# Patient Record
Sex: MALE | Race: WHITE | NOT HISPANIC OR LATINO | Employment: FULL TIME
[De-identification: names, ages, dates, MRNs, and addresses within clinical notes are randomized per-mention and may not be internally consistent; named-entity substitution may affect disease eponyms.]

---

## 2018-05-20 ENCOUNTER — RECORDS - HEALTHEAST (OUTPATIENT)
Dept: ADMINISTRATIVE | Facility: OTHER | Age: 58
End: 2018-05-20

## 2018-12-17 ENCOUNTER — OFFICE VISIT (OUTPATIENT)
Dept: OPTOMETRY | Facility: CLINIC | Age: 58
End: 2018-12-17
Payer: COMMERCIAL

## 2018-12-17 DIAGNOSIS — H52.221 REGULAR ASTIGMATISM OF RIGHT EYE: ICD-10-CM

## 2018-12-17 DIAGNOSIS — H52.4 PRESBYOPIA: Primary | ICD-10-CM

## 2018-12-17 DIAGNOSIS — H52.03 HYPERMETROPIA OF BOTH EYES: ICD-10-CM

## 2018-12-17 PROCEDURE — 92015 DETERMINE REFRACTIVE STATE: CPT | Performed by: OPTOMETRIST

## 2018-12-17 PROCEDURE — 92004 COMPRE OPH EXAM NEW PT 1/>: CPT | Performed by: OPTOMETRIST

## 2018-12-17 RX ORDER — LISINOPRIL 20 MG/1
20 TABLET ORAL DAILY
COMMUNITY

## 2018-12-17 RX ORDER — SIMVASTATIN 40 MG
40 TABLET ORAL AT BEDTIME
COMMUNITY

## 2018-12-17 RX ORDER — ATENOLOL 100 MG/1
100 TABLET ORAL DAILY
COMMUNITY

## 2018-12-17 RX ORDER — ASPIRIN 81 MG/1
81 TABLET ORAL DAILY
COMMUNITY

## 2018-12-17 ASSESSMENT — VISUAL ACUITY
OS_SC: 20/20
OS_CC: 20/40
METHOD: SNELLEN - LINEAR
OD_SC+: -2
CORRECTION_TYPE: GLASSES
OD_SC: 20/30
OD_CC: 20/60-1

## 2018-12-17 ASSESSMENT — TONOMETRY
IOP_METHOD: TONOPEN
OD_IOP_MMHG: 20
OS_IOP_MMHG: 19

## 2018-12-17 ASSESSMENT — SLIT LAMP EXAM - LIDS
COMMENTS: 1+ DERMATOCHALASIS
COMMENTS: 1+ DERMATOCHALASIS

## 2018-12-17 ASSESSMENT — REFRACTION_MANIFEST
OS_CYLINDER: SPHERE
OS_ADD: +2.00
OS_SPHERE: +0.50
OD_CYLINDER: +1.00
OD_SPHERE: +0.50
OD_ADD: +2.00
OD_AXIS: 176

## 2018-12-17 ASSESSMENT — CONF VISUAL FIELD
OS_NORMAL: 1
OD_NORMAL: 1

## 2018-12-17 ASSESSMENT — REFRACTION_WEARINGRX
OD_SPHERE: +2.00
SPECS_TYPE: OTC READERS
OD_CYLINDER: SPHERE
OS_CYLINDER: SPHERE
OS_SPHERE: +2.00

## 2018-12-17 ASSESSMENT — CUP TO DISC RATIO
OS_RATIO: 0.3
OD_RATIO: 0.2

## 2018-12-17 ASSESSMENT — EXTERNAL EXAM - LEFT EYE: OS_EXAM: NORMAL

## 2018-12-17 ASSESSMENT — EXTERNAL EXAM - RIGHT EYE: OD_EXAM: NORMAL

## 2018-12-17 NOTE — LETTER
12/17/2018         RE: Vic Vasquez  [address] [address]        Dear Colleague,    Thank you for referring your patient, Vic Vasquez, to the [facility]. Please see a copy of my visit note below.    Chief Complaint   Patient presents with     Annual Eye Exam         Last Eye Exam: 1st eye exam  Dilated Previously: No, side effects of dilation explained today,info given to patient     What are you currently using to see? otc readers       Distance Vision Acuity: Satisfied with vision    Near Vision Acuity: Not satisfied     Eye Comfort: good  Do you use eye drops? : Yes: visine every morning  Occupation or Hobbies: install kitchen appliances     Susana Piedra Optometric Assistant, A.B.O.C.          Medical, surgical and family histories reviewed and updated 12/17/2018.       OBJECTIVE: See Ophthalmology exam    ASSESSMENT:    ICD-10-CM    1. Presbyopia H52.4 EYE EXAM (SIMPLE-NONBILLABLE)     REFRACTION   2. Regular astigmatism of right eye H52.221 EYE EXAM (SIMPLE-NONBILLABLE)     REFRACTION   3. 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  Your options are a bifocal which would allow you to see distance and near vision or separate glasses for distance and reading.    Return in 1 year for a complete eye exam or sooner if needed.    Lucio Santos OD           Again, thank you for allowing me to participate in the care of your patient.        Sincerely,        Lucio Santos, OD

## 2018-12-17 NOTE — PROGRESS NOTES
Chief Complaint   Patient presents with     Annual Eye Exam         Last Eye Exam: 1st eye exam  Dilated Previously: No, side effects of dilation explained today,info given to patient     What are you currently using to see? otc readers       Distance Vision Acuity: Satisfied with vision    Near Vision Acuity: Not satisfied     Eye Comfort: good  Do you use eye drops? : Yes: visine every morning  Occupation or Hobbies: install kitchen appliances     Susana Piedra Optometric Assistant, A.B.O.C.          Medical, surgical and family histories reviewed and updated 12/17/2018.       OBJECTIVE: See Ophthalmology exam    ASSESSMENT:    ICD-10-CM    1. Presbyopia H52.4 EYE EXAM (SIMPLE-NONBILLABLE)     REFRACTION   2. Regular astigmatism of right eye H52.221 EYE EXAM (SIMPLE-NONBILLABLE)     REFRACTION   3. 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  Your options are a bifocal which would allow you to see distance and near vision or separate glasses for distance and reading.    Return in 1 year for a complete eye exam or sooner if needed.    Lucio Santos, OD

## 2018-12-18 NOTE — PATIENT INSTRUCTIONS
Eyeglass prescription given.  Your options are a bifocal which would allow you to see distance and near vision or separate glasses for distance and reading.    Return in 1 year for a complete eye exam or sooner if needed.    Lucio Santos OD    The affects of the dilating drops last for 4- 6 hours.  You will be more sensitive to light and vision will be blurry up close.  Mydriatic sunglasses were given if needed.      Optometry Providers       Clinic Locations                                 Telephone Number   Dr. Dahiana Gutierrez [facility] [place]   [place] [phone]     [facility] Hours:                [place] [facility] Hours:       [facility] Hours:   [address]   [address] [facility] [address]     [address] [place] [address]   [place] [address]    [place] [address]  Phone: [phone]                    Phone: [phone]     Phone: [phone]                      Monday 8:00-7:00                          Monday 8:00-7:00                          Monday 8:00-7:00              Tuesday 8:00-6:00                          Tuesday 8:00-7:00                          Tuesday 8:00-7:00              Wednesday 8:00-6:00                  Wednesday 8:00-7:00                   Wednesday 8:00-7:00      Thursday 8:00-6:00                        Thursday 8:00-7:00                         Thursday 8:00-7:00            Friday 8:00-5:00                              Friday 8:00-5:00                              Friday 8:00-5:00    [facility] Hours:   [address] [place] [address]  [phone]    Monday 8:00-7:00  Tuesday 8:00-7:00  Wednesday 8:00-7:00  Thursday 8:00-7:00  Friday 8:00-5:00  Please log on to [email or web address] to order your contact lenses.  The link is found on the [facility] page.  As always, Thank you for trusting us with your health care  needs!

## 2020-05-19 ENCOUNTER — RECORDS - HEALTHEAST (OUTPATIENT)
Dept: ADMINISTRATIVE | Facility: OTHER | Age: 60
End: 2020-05-19

## 2023-08-21 ENCOUNTER — OFFICE VISIT (OUTPATIENT)
Dept: URGENT CARE | Facility: URGENT CARE | Age: 63
End: 2023-08-21
Payer: OTHER MISCELLANEOUS

## 2023-08-21 VITALS
SYSTOLIC BLOOD PRESSURE: 146 MMHG | HEART RATE: 51 BPM | DIASTOLIC BLOOD PRESSURE: 83 MMHG | RESPIRATION RATE: 20 BRPM | TEMPERATURE: 97.6 F | OXYGEN SATURATION: 99 % | WEIGHT: 245.5 LBS

## 2023-08-21 DIAGNOSIS — S61.012A LACERATION OF LEFT THUMB WITHOUT FOREIGN BODY WITHOUT DAMAGE TO NAIL, INITIAL ENCOUNTER: Primary | ICD-10-CM

## 2023-08-21 PROCEDURE — 99207 PR NO CHARGE LOS: CPT

## 2023-08-21 PROCEDURE — 12001 RPR S/N/AX/GEN/TRNK 2.5CM/<: CPT | Mod: FA

## 2023-08-21 NOTE — PROGRESS NOTES
ASSESSMENT:  (S61.172M) Laceration of left thumb without foreign body without damage to nail, initial encounter  (primary encounter diagnosis)  Plan: REPAIR SUPERFICIAL, WOUND BODY < =2.5CM,         Dressing, lidocaine 1 % 2 mL    PLAN:  Digital/regional block applied using a total of 2 cc's of Lidocaine 1% plain  Good anesthesia was obtained  Prepped and draped in the usual sterile fashion  Wound cleaned with povidone   Wound irrigated with 20ccs of sterile water  Laceration was closed using 4 4-0 nylon interrupted sutures    After care instructions:  We discussed the need to keep the wound clean and dry for the next 48 hours and after 48 hours it is okay to get wet but do not submerge.  Discussed using bacitracin and Band-Aid to the area for dressing.  Informed the patient to have the sutures removed in 14 days.  Discussed the need to return to clinic with any redness, swelling, drainage, bleeding, pain and/or fever/chills.  Patient acknowledged their understanding of the above plan.     The use of Dragon/Invenshure dictation services may have been used to construct the content in this note; any grammatical or spelling errors are non-intentional. Please contact the author of this note directly if you are in need of any clarification.      Adrien Tracy, BRIONNA CNP      SUBJECTIVE:  Vic Vasquez is a 63 year old male who presents to the clinic with a laceration on the left thumb sustained 3 hours ago.  This is a work related injury.  Mechanism of injury: cut on metal.    Associated symptoms: Denies numbness, weakness, or loss of function  Last tetanus booster within 5 years: yes    ROS:   Negative except noted above.    OBJECTIVE:  Blood pressure (!) 146/83, pulse 51, temperature 97.6  F (36.4  C), temperature source Tympanic, resp. rate 20, weight 111.4 kg (245 lb 8 oz), SpO2 99 %.  The patient appears today in alert and in no apparent distress distress  Size of laceration: 2 centimeters  Characteristics of  the laceration: active bleeding and jagged  Tendon function intact: yes  Sensation to light touch intact: yes  Pulses intact: yes

## 2023-08-22 NOTE — PATIENT INSTRUCTIONS
Keep the wound clean and dry for the next 48 hours.  After 48 hours it is okay to get wet but do not submerge.  Bacitracin and Band-Aid to the area for dressing.  Sutures removed in 14 days.  Return to clinic, with any redness, swelling, drainage, bleeding, pain and/or fever/chills.

## 2023-09-05 ENCOUNTER — OFFICE VISIT (OUTPATIENT)
Dept: URGENT CARE | Facility: URGENT CARE | Age: 63
End: 2023-09-05
Payer: OTHER MISCELLANEOUS

## 2023-09-05 VITALS
SYSTOLIC BLOOD PRESSURE: 137 MMHG | WEIGHT: 245.2 LBS | TEMPERATURE: 98.2 F | OXYGEN SATURATION: 97 % | DIASTOLIC BLOOD PRESSURE: 73 MMHG | HEART RATE: 58 BPM

## 2023-09-05 DIAGNOSIS — Z48.02 VISIT FOR SUTURE REMOVAL: Primary | ICD-10-CM

## 2023-09-05 DIAGNOSIS — S61.012D LACERATION OF LEFT THUMB WITHOUT COMPLICATION, SUBSEQUENT ENCOUNTER: ICD-10-CM

## 2023-09-05 PROCEDURE — 99207 PR NO CHARGE LOS: CPT | Performed by: PHYSICIAN ASSISTANT

## 2023-09-05 ASSESSMENT — ENCOUNTER SYMPTOMS
WHEEZING: 0
JOINT SWELLING: 0
SHORTNESS OF BREATH: 0
FEVER: 0
CONSTITUTIONAL NEGATIVE: 1
NECK STIFFNESS: 0
MYALGIAS: 0
BACK PAIN: 0
CHILLS: 0
FATIGUE: 0
COUGH: 0
COLOR CHANGE: 0
WOUND: 0
NECK PAIN: 0
ARTHRALGIAS: 0
CHEST TIGHTNESS: 0
PALPITATIONS: 0

## 2023-09-05 NOTE — PROGRESS NOTES
Jeanette Ho is a 63 year old, presenting for the following health issues:  Suture Removal    HPI   Concern - suture removal   Onset: 15days  Description:  Sustained a L thumb laceration 15days ago and was repaired here in urgent care.  Was told to return for suture removal in 14days.    Intensity: mild  Progression of Symptoms:  improving  Accompanying Signs & Symptoms: No swelling, redness, drainage or fevers.  UTD on his Td.  Previous history of similar problem: none  Precipitating factors:        Worsened by: none  Alleviating factors:        Improved by: none  Therapies tried and outcome: suture repair with good relief  There is no problem list on file for this patient.    Current Outpatient Medications   Medication    aspirin 81 MG EC tablet    atenolol (TENORMIN) 100 MG tablet    lisinopril (PRINIVIL/ZESTRIL) 20 MG tablet    simvastatin (ZOCOR) 40 MG tablet     No current facility-administered medications for this visit.      No Known Allergies    Review of Systems   Constitutional: Negative.  Negative for chills, fatigue and fever.   Respiratory:  Negative for cough, chest tightness, shortness of breath and wheezing.    Cardiovascular:  Negative for chest pain, palpitations and peripheral edema.   Musculoskeletal:  Negative for arthralgias, back pain, gait problem, joint swelling, myalgias, neck pain and neck stiffness.   Skin: Negative.  Negative for color change, pallor, rash and wound.   All other systems reviewed and are negative.           Objective    /73 (BP Location: Left arm, Patient Position: Sitting, Cuff Size: Adult Large)   Pulse 58   Temp 98.2  F (36.8  C) (Tympanic)   Wt 111.2 kg (245 lb 3.2 oz)   SpO2 97%   There is no height or weight on file to calculate BMI.  Physical Exam  Vitals and nursing note reviewed.   Constitutional:       General: He is not in acute distress.     Appearance: Normal appearance. He is normal weight. He is not ill-appearing.   Musculoskeletal:       Right hand: Normal.      Left hand: Laceration (well healed laceration present over the thumb.  no tenderness, erythema or drainage present.) present. No swelling, deformity, tenderness or bony tenderness. Normal range of motion. Normal strength. Normal sensation. There is no disruption of two-point discrimination. Normal capillary refill. Normal pulse.   Skin:     General: Skin is warm.      Capillary Refill: Capillary refill takes less than 2 seconds.   Neurological:      Mental Status: He is alert and oriented to person, place, and time.   Psychiatric:         Mood and Affect: Mood normal.         Behavior: Behavior normal.         Thought Content: Thought content normal.         Judgment: Judgment normal.          Assessment/Plan:  Visit for suture removal:  Well healed laceration without complications.  Sutures were removed in clinic today.  No evidence of infection at this time.  Tylenol/ibuprofen as needed for pain.  Recheck in clinic if symptoms worsen or if symptoms do not improve.    Laceration of left thumb without complication, subsequent encounter        Lucero See KYLE Ivey